# Patient Record
Sex: MALE | Race: WHITE | NOT HISPANIC OR LATINO | Employment: OTHER | URBAN - METROPOLITAN AREA
[De-identification: names, ages, dates, MRNs, and addresses within clinical notes are randomized per-mention and may not be internally consistent; named-entity substitution may affect disease eponyms.]

---

## 2017-01-06 ENCOUNTER — OFFICE VISIT (OUTPATIENT)
Dept: MEDICAL GROUP | Facility: PHYSICIAN GROUP | Age: 67
End: 2017-01-06
Payer: OTHER GOVERNMENT

## 2017-01-06 VITALS
HEART RATE: 104 BPM | TEMPERATURE: 97.9 F | SYSTOLIC BLOOD PRESSURE: 134 MMHG | DIASTOLIC BLOOD PRESSURE: 68 MMHG | HEIGHT: 70 IN | OXYGEN SATURATION: 92 % | BODY MASS INDEX: 28.77 KG/M2 | WEIGHT: 201 LBS | RESPIRATION RATE: 16 BRPM

## 2017-01-06 DIAGNOSIS — E87.1 HYPONATREMIA: ICD-10-CM

## 2017-01-06 DIAGNOSIS — B18.2 CHRONIC HEPATITIS C WITHOUT HEPATIC COMA (HCC): ICD-10-CM

## 2017-01-06 PROCEDURE — 99213 OFFICE O/P EST LOW 20 MIN: CPT | Performed by: FAMILY MEDICINE

## 2017-01-06 NOTE — Clinical Note
[facility]  [address]  [facility] [address]     January 6, 2017    Patient: Elvin Malone   YOB: 1950   Date of Visit: 1/6/2017       To Whom It May Concern:    Elvin Malone was seen and treated in our department on 1/6/2017  for medical reasons.  Pt is excused from work on 1/6/2017.  Pt may return to   Work on 1/9/2017.      Sincerely,         Yudi Cobb D.O.

## 2017-01-06 NOTE — MR AVS SNAPSHOT
"Elvin Malone   2017 3:20 PM   Office Visit   MRN: [id number]    Department:  [facility]   Dept Phone:  [phone]    Description:  Male : 1950   Provider:  Yudi Cobb D.O.           Reason for Visit     Follow-Up           Allergies as of 2017     No Known Allergies      You were diagnosed with     Chronic hepatitis C without hepatic coma (HCC)   [965957]       Hyponatremia   [886222]         Vital Signs     Blood Pressure Pulse Temperature Respirations Height Weight    134/68 mmHg 104 36.6 °C (97.9 °F) 16 1.778 m (5' 10\") 91.173 kg (201 lb)    Body Mass Index Oxygen Saturation Smoking Status             28.84 kg/m2 92% Never Smoker          Basic Information     Date Of Birth Sex Race Ethnicity Preferred Language    1950 Male White Non- English      Your appointments     2017  2:00 PM   FOLLOW UP with Adelina Sequeira M.D.   [facility]    [address]  [facility] [id number] [phone]            Feb 10, 2017  8:00 AM   Follow Up Med Management with Dennis Powers M.D.   [facility]IOR[facility] [address] [place] [address])    [address] [place] [address]  [address]  [facility] [address]   [phone]            2017  4:20 PM   Established Patient with Yudi Cobb D.O.   [facility])    [address]  [facility] [address] [phone]           You will be receiving a confirmation call a few days before your appointment from our automated call confirmation system.              Problem List              ICD-10-CM Priority Class Noted - Resolved    Anxiety F41.9   2014 - Present    Hepatitis C B19.20   2014 - Present    Osteoarthrosis involving lower leg M17.9   3/2/2015 - Present    Sleep apnea G47.30   3/2/2016 - Present      Health Maintenance        Date Due Completion Dates    IMM DTaP/Tdap/Td Vaccine (1 - Tdap) 1971 ---    IMM " ZOSTER VACCINE 7/20/2012 ---    COLONOSCOPY 5/5/2023 5/5/2013 (Done)    Override on 5/5/2013: Done ([facility] )            Current Immunizations     Influenza TIV (IM) 12/8/2016    Pneumococcal Vaccine (PCV7) Historical Data 12/5/2015    Pneumococcal polysaccharide vaccine (PPSV-23) 11/11/2011      Below and/or attached are the medications your provider expects you to take. Review all of your home medications and newly ordered medications with your provider and/or pharmacist. Follow medication instructions as directed by your provider and/or pharmacist. Please keep your medication list with you and share with your provider. Update the information when medications are discontinued, doses are changed, or new medications (including over-the-counter products) are added; and carry medication information at all times in the event of emergency situations     Allergies:  No Known Allergies          Medications  Valid as of: January 06, 2017 -  3:59 PM    Generic Name Brand Name Tablet Size Instructions for use    Aspirin (Chew Tab) ASA 81 MG Take 1 Tab by mouth every day.        Fluticasone Propionate (Suspension) FLONASE 50 MCG/ACT Spray 1 Spray in nose 2 times a day.        Sertraline HCl (Tab) ZOLOFT 100 MG Take 1 Tab by mouth every day.        .                 Medicines prescribed today were sent to:     [id number] - [place], [place] - [address]    [address] [place] [address]    Phone: [phone] Fax: [phone]    Open 24 Hours?: No      Medication refill instructions:       If your prescription bottle indicates you have medication refills left, it is not necessary to call your provider’s office. Please contact your pharmacy and they will refill your medication.    If your prescription bottle indicates you do not have any refills left, you may request refills at any time through one of the following ways: The online [employer] system (except Urgent Care), by calling your  provider’s office, or by asking your pharmacy to contact your provider’s office with a refill request. Medication refills are processed only during regular business hours and may not be available until the next business day. Your provider may request additional information or to have a follow-up visit with you prior to refilling your medication.   *Please Note: Medication refills are assigned a new Rx number when refilled electronically. Your pharmacy may indicate that no refills were authorized even though a new prescription for the same medication is available at the pharmacy. Please request the medicine by name with the pharmacy before contacting your provider for a refill.        Your To Do List     Future Labs/Procedures Complete By Expires    BASIC METABOLIC PANEL  As directed 1/6/2018    OSMOLALITY SERUM  As directed 1/6/2018    OSMOLALITY URINE  As directed 1/6/2018    URINE SODIUM RANDOM  As directed 1/6/2018         MyChart Access Code: Activation code not generated  Current [employer] Status: Active

## 2017-01-06 NOTE — PROGRESS NOTES
Subjective:     Chief Complaint   Patient presents with   • Follow-Up       Elvin Malone is a 66 y.o. male here today for one-month follow-up.    Chronic hep C  He was referred to GI, who will see pt on . Liver enzymes were elevated on recent labs of AST ALT and alkaline phosphatase significantly elevated. Patient has no signs of ascites, jaundice, shortness of breath.    Anxiety  Pt reports resolved since last visit. Patient is no longer taking propanolol. He states that taking Zoloft daily has controlled his mood. He denies any depressed mood, anxiety, thoughts of self-harm, homicidal ideations, or unstable mood.    Lab results showed hyponatremia with sodium of 130. Patient denies any changes in urine output, dehydration, or excessive water intake.          No Known Allergies  Current medicines (including changes today)  Current Outpatient Prescriptions   Medication Sig Dispense Refill   • aspirin (ASA) 81 MG Chew Tab chewable tablet Take 1 Tab by mouth every day. 100 Tab 0   • sertraline (ZOLOFT) 100 MG Tab Take 1 Tab by mouth every day. 30 Tab 2   • fluticasone (FLONASE) 50 MCG/ACT nasal spray Spray 1 Spray in nose 2 times a day. 16 g 3     No current facility-administered medications for this visit.     Social History   Substance Use Topics   • Smoking status: Never Smoker    • Smokeless tobacco: Never Used      Comment: continue to avoid   • Alcohol Use: No     Family Status   Relation Status Death Age   • Mother     • Brother Alive    • Brother Alive    • Brother     • Father Other    • Maternal Grandmother     • Maternal Grandfather       Family History   Problem Relation Age of Onset   • Lung Disease Mother    • Cancer Mother      Lung   • Other Mother      Kidney problem   • Other Brother    • Lung Disease Brother      smoker   • Heart Disease Brother    • Hypertension Brother    • Hyperlipidemia Brother    • Arthritis Brother      RA     He    has a past  "medical history of Sleep apnea; Snoring; Allergy, unspecified not elsewhere classified; Anxiety; Arthritis; Pain (2/16/15 ); Pneumonia; Depression; Type II or unspecified type diabetes mellitus without mention of complication, not stated as uncontrolled; and Hepatitis C. He also has no past medical history of Anemia, Blood transfusion, without reported diagnosis, Clotting disorder (HCC), Chronic airway obstruction, not elsewhere classified (HCC), Diabetic neuropathy (HCC), GERD (gastroesophageal reflux disease), Headache(784.0), Hyperlipidemia, IBD (inflammatory bowel disease), Meningitis, Headache, classical migraine, Substance abuse, Ulcer (HCC), Asymptomatic human immunodeficiency virus (HIV) infection status, or Urinary tract infection, site not specified.        ROS   GEN: no weight loss, fevers, or chills  HEENT: no blurry vision or change in vision, no ear pain, no difficulty swallowing, no throat pain, no runny nose, no nasal congestion  Resp: no shortness of breath, no cough  CV: no racing heart, no irregular beats, no chest pain  Abd: no nausea, no vomiting, no diarrhea, no constipation, no blood in stool, no dark stools, no incontinence         Objective:     Blood pressure 134/68, pulse 104, temperature 36.6 °C (97.9 °F), resp. rate 16, height 1.778 m (5' 10\"), weight 91.173 kg (201 lb), SpO2 92 %. Body mass index is 28.84 kg/(m^2).  Physical Exam:  Constitutional: Alert, no distress.  Skin: Warm, dry, good turgor, no rashes in visible areas.  Eye: Equal, round and reactive, conjunctiva clear, lids normal.  Neck: Trachea midline, no masses, no thyromegaly. No cervical or supraclavicular lymphadenopathy. Full ROM  Respiratory: Unlabored respiratory effort, good air movement, lungs clear to auscultation, no wheezes, no ronchi.  Cardiovascular:RRR, +S1, S2, no murmur, no peripheral edema, pedal and radial pulses equal and intact bilat  Abdomen: Soft, non-tender, no masses, no hepatosplenomegaly.  MSK:5/5 " muscle strength in upper extremities as well as lower extremity bilaterally  Psych: Alert and oriented x3, appropriate affect and mood.  Neuro: CN2-12 intact, no gross motor or sensory deficits      Assessment and Plan:   The following treatment plan was discussed    1. Chronic hepatitis C without hepatic coma (HCC)  AST, ALT, alkaline phosphatase noted to be elevated. Patient has appointment with GI on January 10.    2. Hyponatremia  New finding: Patient has no symptoms of hyponatremia. Denies excessive water intake or low solute diet. Differential diagnosis includes liver cirrhosis, SIADH due to steroid use, hypothyroidism. We will repeat BMP to assess sodium level. I've also ordered urinalysis body, sodium is mildly, urine sodium, and TSH for evaluation of cause.  - BASIC METABOLIC PANEL; Future  - OSMOLALITY SERUM; Future  - URINE SODIUM RANDOM; Future  - OSMOLALITY URINE; Future  - TSH WITH REFLEX TO FT4; Future      Followup: Return in about 3 months (around 4/6/2017).    Please note that this dictation was created using voice recognition software. I have made every reasonable attempt to correct obvious errors, but I expect that there are errors of grammar and possibly content that I did not discover before finalizing the note.

## 2017-01-24 ENCOUNTER — HOSPITAL ENCOUNTER (OUTPATIENT)
Dept: RADIOLOGY | Facility: MEDICAL CENTER | Age: 67
End: 2017-01-24
Attending: PHYSICIAN ASSISTANT
Payer: OTHER GOVERNMENT

## 2017-01-24 DIAGNOSIS — R94.5 NONSPECIFIC ABNORMAL RESULTS OF LIVER FUNCTION STUDY: ICD-10-CM

## 2017-01-24 DIAGNOSIS — B18.2 CHRONIC HEPATITIS C WITH HEPATIC COMA (HCC): ICD-10-CM

## 2017-01-24 PROCEDURE — 76705 ECHO EXAM OF ABDOMEN: CPT

## 2017-02-10 ENCOUNTER — OFFICE VISIT (OUTPATIENT)
Dept: URGENT CARE | Facility: PHYSICIAN GROUP | Age: 67
End: 2017-02-10
Payer: OTHER GOVERNMENT

## 2017-02-10 ENCOUNTER — APPOINTMENT (OUTPATIENT)
Dept: BEHAVIORAL HEALTH | Facility: PHYSICIAN GROUP | Age: 67
End: 2017-02-10
Payer: OTHER GOVERNMENT

## 2017-02-10 VITALS
WEIGHT: 200 LBS | OXYGEN SATURATION: 95 % | HEART RATE: 89 BPM | BODY MASS INDEX: 28.63 KG/M2 | HEIGHT: 70 IN | DIASTOLIC BLOOD PRESSURE: 70 MMHG | SYSTOLIC BLOOD PRESSURE: 112 MMHG | TEMPERATURE: 98.2 F

## 2017-02-10 DIAGNOSIS — S39.012A LUMBAR STRAIN, INITIAL ENCOUNTER: ICD-10-CM

## 2017-02-10 PROCEDURE — 99214 OFFICE O/P EST MOD 30 MIN: CPT | Performed by: FAMILY MEDICINE

## 2017-02-10 RX ORDER — PREDNISONE 20 MG/1
TABLET ORAL
Qty: 15 TAB | Refills: 0 | Status: SHIPPED | OUTPATIENT
Start: 2017-02-10

## 2017-02-10 RX ORDER — OXYCODONE HYDROCHLORIDE 5 MG/1
TABLET ORAL
Qty: 15 TAB | Refills: 0 | Status: SHIPPED | OUTPATIENT
Start: 2017-02-10

## 2017-02-10 RX ORDER — CYCLOBENZAPRINE HCL 10 MG
TABLET ORAL
Qty: 30 TAB | Refills: 0 | Status: SHIPPED | OUTPATIENT
Start: 2017-02-10

## 2017-02-10 RX ORDER — KETOROLAC TROMETHAMINE 30 MG/ML
30 INJECTION, SOLUTION INTRAMUSCULAR; INTRAVENOUS ONCE
Status: COMPLETED | OUTPATIENT
Start: 2017-02-10 | End: 2017-02-10

## 2017-02-10 RX ADMIN — KETOROLAC TROMETHAMINE 30 MG: 30 INJECTION, SOLUTION INTRAMUSCULAR; INTRAVENOUS at 15:57

## 2017-02-10 ASSESSMENT — PAIN SCALES - GENERAL: PAINLEVEL: 6=MODERATE PAIN

## 2017-02-10 NOTE — PROGRESS NOTES
Chief Complaint:    Chief Complaint   Patient presents with   • Back Pain     Bent over while turning and reaching when back began to spasm.  Was not lifting anything. Occurance happend 3 hours ago.       History of Present Illness:    Wife present. This is a new problem. 3 hours ago bent over while turning and reaching to move a cord. Left lower back started spasming. Now pain is moderate-severe. No radiating pain down legs. No loss of bowel/bladder control. Took 2 Tylenol about 2 hours ago without help. Had similar problem many years ago and was treated with medication with help. Tolerated recent Prednisone 60 mg a day x 5 days rx'd for hives. Has taken muscle relaxer in past, Flexeril sounds familiar. Has tolerated Oxycodone in past.      Review of Systems:    Constitutional: Negative for fever, chills, and diaphoresis.   Eyes: Negative for change in vision, photophobia, pain, redness, and discharge.  ENT: Negative for ear pain, ear discharge, hearing loss, tinnitus, nasal congestion, nosebleeds, and sore throat.    Respiratory: Negative for cough, hemoptysis, sputum production, shortness of breath, wheezing, and stridor.    Cardiovascular: Negative for chest pain, palpitations, orthopnea, claudication, leg swelling, and PND.   Gastrointestinal: Has Hepatitis C and elevated LFTs. Negative for abdominal pain, nausea, vomiting, diarrhea, constipation, blood in stool, and melena.   Genitourinary: Negative for dysuria, urinary urgency, urinary frequency, hematuria, and flank pain.   Musculoskeletal: See HPI.   Skin: Negative for rash and itching.   Neurological: Negative for dizziness, tingling, tremors, sensory change, speech change, focal weakness, seizures, loss of consciousness, and headaches.   Endo: Negative for polydipsia.   Heme: Does not bruise/bleed easily.   Psychiatric/Behavioral: Depression/anxiety, on medication.    Past Medical History:    Past Medical History   Diagnosis Date   • Sleep apnea      uses  CPAP   • Snoring    • Allergy, unspecified not elsewhere classified    • Anxiety    • Arthritis      chronic backpain   • Pain 2/16/15      right knee and back 5/10   • Pneumonia      age 7   • Depression      anxiety   • Type II or unspecified type diabetes mellitus without mention of complication, not stated as uncontrolled      Pre diabetes- diet controlled   • Hepatitis C        Past Surgical History:    Past Surgical History   Procedure Laterality Date   • Other abdominal surgery  1980     left and right inguinal hernia hepair   • Parotidectomy superficial  12/6/2010     Performed by DARWIN JUÁREZ at SURGERY SAME DAY [facility]   • Abdominal exploration     • Hernia repair        left and right inguinal   • Circumcision child     • Other orthopedic surgery  1990     right meniscus repair   • Knee arthroscopy       Cleaned up   • Knee arthroplasty total  3/2/2015     Performed by Max Quiles M.D. at SURGERY [facility]   • Inguinal hernia laparoscopic bilateral  4/15/2016     Procedure: INGUINAL HERNIA LAPAROSCOPIC BILATERAL;  Surgeon: Hammad Bradley M.D.;  Location: SURGERY [facility];  Service:    • Knee replacement, total  2014     Right       Social History:    Social History     Social History   • Marital Status:      Spouse Name: N/A   • Number of Children: N/A   • Years of Education: N/A     Occupational History   • Not on file.     Social History Main Topics   • Smoking status: Never Smoker    • Smokeless tobacco: Never Used      Comment: continue to avoid   • Alcohol Use: No   • Drug Use: No   • Sexual Activity: No     Other Topics Concern   • Not on file     Social History Narrative       Family History:    Family History   Problem Relation Age of Onset   • Lung Disease Mother    • Cancer Mother      Lung   • Other Mother      Kidney problem   • Other Brother    • Lung Disease Brother      smoker   • Heart Disease Brother    • Hypertension Brother    • Hyperlipidemia  "Brother    • Arthritis Brother      RA       Medications:    Current Outpatient Prescriptions on File Prior to Visit   Medication Sig Dispense Refill   • aspirin (ASA) 81 MG Chew Tab chewable tablet Take 1 Tab by mouth every day. 100 Tab 0   • sertraline (ZOLOFT) 100 MG Tab Take 1 Tab by mouth every day. 30 Tab 2   • fluticasone (FLONASE) 50 MCG/ACT nasal spray Spray 1 Spray in nose 2 times a day. 16 g 3     No current facility-administered medications on file prior to visit.       Allergies:    No Known Allergies      Vitals:    Filed Vitals:    02/10/17 1527   BP: 112/70   Pulse: 89   Temp: 36.8 °C (98.2 °F)   Height: 1.778 m (5' 10\")   Weight: 90.719 kg (200 lb)   SpO2: 95%       Physical Exam:    Constitutional: Vital signs reviewed. Appears well-developed and well-nourished. No acute distress.   Eyes: Sclera white, conjunctivae clear.  ENT: External ears normal. Hearing normal.  Pulmonary/Chest: Respirations non-labored.  Musculoskeletal: Walks mildly cautious. Decreased lumbar ROM - flexion (severe), extension (moderate), left rotation (moderate), right lateral bending (moderate), otherwise mild decrease. No tenderness to palpation. No muscular atrophy or weakness.  Neurological: Alert and oriented to person, place, and time. Muscle tone normal. Coordination normal. Light touch and sensation normal.   Skin: No rashes or lesions. Warm, dry, normal turgor.  Psychiatric: Normal mood and affect. Behavior is normal. Judgment and thought content normal.       Assessment / Plan:    1. Lumbar strain, initial encounter  - ketorolac (TORADOL) injection 30 mg; 1 mL by Intramuscular route Once.  - predniSONE (DELTASONE) 20 MG Tab; 3 TABS ONCE A DAY X 5 DAYS. TAKE WITH FOOD.  Dispense: 15 Tab; Refill: 0  - oxycodone immediate-release (ROXICODONE) 5 MG Tab; 1 TAB EVERY 6 HOURS ONLY IF NEEDED FOR PAIN. MAY CAUSE DROWSINESS.  Dispense: 15 Tab; Refill: 0  - cyclobenzaprine (FLEXERIL) 10 MG Tab; 1 TAB EVERY 8 HOURS ONLY IF " NEEDED FOR PAIN, MUSCLE SPASM, AND/OR MUSCLE TIGHTNESS. MAY CAUSE DROWSINESS.  Dispense: 30 Tab; Refill: 0      Discussed with them DDX and management options.    Rec'd relative rest.    Agreeable to potent anti-inflammatory medication, prn muscle relaxer, and prn pain killer with medications given and prescribed.  report checked - last narcotic Rx was Oxycodone-APAP 5-325 mg #60 on 4/15/16.    Will avoid APAP due to elevated LFTs (Hep C).    Follow-up with PCP or urgent care if getting worse or not gradually better with time and above.

## 2017-02-10 NOTE — MR AVS SNAPSHOT
"        Elvin Malone   2/10/2017 3:20 PM   Office Visit   MRN: [id number]    Department:  [facility]   Dept Phone:  [phone]    Description:  Male : 1950   Provider:  Garret Eaton M.D.           Reason for Visit     Back Pain Bent over while turning and reaching when back began to spasm.  Was not lifting anything. Occurance happend 3 hours ago.      Allergies as of 2/10/2017     No Known Allergies      You were diagnosed with     Lumbar strain, initial encounter   [020367]         Vital Signs     Blood Pressure Pulse Temperature Height Weight Body Mass Index    112/70 mmHg 89 36.8 °C (98.2 °F) 1.778 m (5' 10\") 90.719 kg (200 lb) 28.70 kg/m2    Oxygen Saturation Smoking Status                95% Never Smoker           Basic Information     Date Of Birth Sex Race Ethnicity Preferred Language    1950 Male White Non- English      Your appointments     2017  1:30 PM   Follow Up Med Management with Dennis Powers M.D.   BEHAVIORAL HEALTH [address] [place] [address])    [address] [place] [address]  [address]  [facility] [address]   [phone]            2017  4:20 PM   Established Patient with Yudi Cobb D.O.   [facility])    [address] [place] [address], [address]  [facility] [address] [phone]           You will be receiving a confirmation call a few days before your appointment from our automated call confirmation system.              Problem List              ICD-10-CM Priority Class Noted - Resolved    Anxiety F41.9   2014 - Present    Hepatitis C B19.20   2014 - Present    Osteoarthrosis involving lower leg M17.9   3/2/2015 - Present    Sleep apnea G47.30   3/2/2016 - Present      Health Maintenance        Date Due Completion Dates    IMM DTaP/Tdap/Td Vaccine (1 - Tdap) 1971 ---    IMM ZOSTER VACCINE 2012 ---    COLONOSCOPY 2023 (Done)    Override on 2013: Done ([facility] )            "   Current Immunizations     Influenza TIV (IM) 12/8/2016    Pneumococcal Vaccine (PCV7) Historical Data 12/5/2015    Pneumococcal polysaccharide vaccine (PPSV-23) 11/11/2011      Below and/or attached are the medications your provider expects you to take. Review all of your home medications and newly ordered medications with your provider and/or pharmacist. Follow medication instructions as directed by your provider and/or pharmacist. Please keep your medication list with you and share with your provider. Update the information when medications are discontinued, doses are changed, or new medications (including over-the-counter products) are added; and carry medication information at all times in the event of emergency situations     Allergies:  No Known Allergies          Medications  Valid as of: February 10, 2017 -  4:16 PM    Generic Name Brand Name Tablet Size Instructions for use    Acetaminophen   Take  by mouth.        Aspirin (Chew Tab) ASA 81 MG Take 1 Tab by mouth every day.        Cyclobenzaprine HCl (Tab) FLEXERIL 10 MG 1 TAB EVERY 8 HOURS ONLY IF NEEDED FOR PAIN, MUSCLE SPASM, AND/OR MUSCLE TIGHTNESS. MAY CAUSE DROWSINESS.        Fluticasone Propionate (Suspension) FLONASE 50 MCG/ACT Spray 1 Spray in nose 2 times a day.        OxyCODONE HCl (Tab) ROXICODONE 5 MG 1 TAB EVERY 6 HOURS ONLY IF NEEDED FOR PAIN. MAY CAUSE DROWSINESS.        PredniSONE (Tab) DELTASONE 20 MG 3 TABS ONCE A DAY X 5 DAYS. TAKE WITH FOOD.        Sertraline HCl (Tab) ZOLOFT 100 MG Take 1 Tab by mouth every day.        .                 Medicines prescribed today were sent to:     [id number] - [place] - [address] [facility]    [address] [facility] [place] [address]    Phone: [phone] Fax: [phone]    Open 24 Hours?: No      Medication refill instructions:       If your prescription bottle indicates you have medication refills left, it is not necessary to call your provider’s office. Please contact your pharmacy  and they will refill your medication.    If your prescription bottle indicates you do not have any refills left, you may request refills at any time through one of the following ways: The online [employer] system (except Urgent Care), by calling your provider’s office, or by asking your pharmacy to contact your provider’s office with a refill request. Medication refills are processed only during regular business hours and may not be available until the next business day. Your provider may request additional information or to have a follow-up visit with you prior to refilling your medication.   *Please Note: Medication refills are assigned a new Rx number when refilled electronically. Your pharmacy may indicate that no refills were authorized even though a new prescription for the same medication is available at the pharmacy. Please request the medicine by name with the pharmacy before contacting your provider for a refill.           [employer] Access Code: Activation code not generated  Current [employer] Status: Active

## 2017-02-21 ENCOUNTER — OFFICE VISIT (OUTPATIENT)
Dept: BEHAVIORAL HEALTH | Facility: PHYSICIAN GROUP | Age: 67
End: 2017-02-21
Payer: OTHER GOVERNMENT

## 2017-02-21 DIAGNOSIS — F34.1 DYSTHYMIA: ICD-10-CM

## 2017-02-21 PROCEDURE — 99214 OFFICE O/P EST MOD 30 MIN: CPT | Performed by: PSYCHIATRY & NEUROLOGY

## 2017-02-21 RX ORDER — SERTRALINE HYDROCHLORIDE 100 MG/1
100 TABLET, FILM COATED ORAL DAILY
Qty: 90 TAB | Refills: 1 | Status: SHIPPED | OUTPATIENT
Start: 2017-02-21 | End: 2017-03-23 | Stop reason: SDUPTHER

## 2017-02-21 NOTE — MR AVS SNAPSHOT
Elvin Malone   2017 1:30 PM   Office Visit   MRN: [id number]    Department:  [facility]   Dept Phone:  [phone]    Description:  Male : 1950   Provider:  Dennis Powers M.D.           Reason for Visit     Follow-Up things have been better since I quit      Allergies as of 2017     No Known Allergies      You were diagnosed with     Dysthymia   [445297]         Vital Signs     Smoking Status                   Never Smoker            Basic Information     Date Of Birth Sex Race Ethnicity Preferred Language    1950 Male White Non- English      Your appointments     2017  4:20 PM   Established Patient with Yudi Cobb D.O.   [facility])    [address], [address]  [place] [address] [phone]           You will be receiving a confirmation call a few days before your appointment from our automated call confirmation system.              Problem List              ICD-10-CM Priority Class Noted - Resolved    Anxiety F41.9   2014 - Present    Hepatitis C B19.20   2014 - Present    Osteoarthrosis involving lower leg M17.9   3/2/2015 - Present    Sleep apnea G47.30   3/2/2016 - Present      Health Maintenance        Date Due Completion Dates    IMM DTaP/Tdap/Td Vaccine (1 - Tdap) 1971 ---    IMM ZOSTER VACCINE 2012 ---    COLONOSCOPY 2023 (Done)    Override on 2013: Done ([facility] )            Current Immunizations     Influenza TIV (IM) 2016    Pneumococcal Vaccine (PCV7) Historical Data 2015    Pneumococcal polysaccharide vaccine (PPSV-23) 2011      Below and/or attached are the medications your provider expects you to take. Review all of your home medications and newly ordered medications with your provider and/or pharmacist. Follow medication instructions as directed by your provider and/or pharmacist. Please keep your medication  list with you and share with your provider. Update the information when medications are discontinued, doses are changed, or new medications (including over-the-counter products) are added; and carry medication information at all times in the event of emergency situations     Allergies:  No Known Allergies          Medications  Valid as of: February 21, 2017 -  2:14 PM    Generic Name Brand Name Tablet Size Instructions for use    Acetaminophen   Take  by mouth.        Aspirin (Chew Tab) ASA 81 MG Take 1 Tab by mouth every day.        Cyclobenzaprine HCl (Tab) FLEXERIL 10 MG 1 TAB EVERY 8 HOURS ONLY IF NEEDED FOR PAIN, MUSCLE SPASM, AND/OR MUSCLE TIGHTNESS. MAY CAUSE DROWSINESS.        Fluticasone Propionate (Suspension) FLONASE 50 MCG/ACT Spray 1 Spray in nose 2 times a day.        OxyCODONE HCl (Tab) ROXICODONE 5 MG 1 TAB EVERY 6 HOURS ONLY IF NEEDED FOR PAIN. MAY CAUSE DROWSINESS.        PredniSONE (Tab) DELTASONE 20 MG 3 TABS ONCE A DAY X 5 DAYS. TAKE WITH FOOD.        Sertraline HCl (Tab) ZOLOFT 100 MG Take 1 Tab by mouth every day.        .                 Medicines prescribed today were sent to:     [id number] - [place], [place] - [address]    [address] [place] [address]    Phone: [phone] Fax: [phone]    Open 24 Hours?: No      Medication refill instructions:       If your prescription bottle indicates you have medication refills left, it is not necessary to call your provider’s office. Please contact your pharmacy and they will refill your medication.    If your prescription bottle indicates you do not have any refills left, you may request refills at any time through one of the following ways: The online [employer] system (except Urgent Care), by calling your provider’s office, or by asking your pharmacy to contact your provider’s office with a refill request. Medication refills are processed only during regular business hours and may not be available until the next business  day. Your provider may request additional information or to have a follow-up visit with you prior to refilling your medication.   *Please Note: Medication refills are assigned a new Rx number when refilled electronically. Your pharmacy may indicate that no refills were authorized even though a new prescription for the same medication is available at the pharmacy. Please request the medicine by name with the pharmacy before contacting your provider for a refill.           [employer] Access Code: Activation code not generated  Current [employer] Status: Active

## 2017-02-21 NOTE — PROGRESS NOTES
[facility]  PSYCHIATRIC FOLLOW-UP NOTE    Name: Elvin Malone  MRN: [id number]  : 1950  Age: 66 y.o.  Date of assessment: 2017  PCP: Yudi Cobb D.O.  Persons in attendance: Patient    REASON FOR VISIT/CHIEF COMPLAINT (as stated by Patient):  Elvin Malone is a 66 y.o., White male, attending follow-up appointment for   Chief Complaint   Patient presents with   • Follow-Up     things have been better since I quit   .      HISTORY OF PRESENT ILLNESS:    Pt being tx for MDD, anxiety.  Pt has been complaint with meds, and denies SE.   Pt reports that he and his wife put the house on the market, and it has sold.  He and the wife are going to be moving.  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  Pt explains that he and the wife had been discussing a change for a while, but the wife accelerated the house sale.  Pt reports he has been doing well with just the Zoloft 100mg PO Daily.  He stopped taking the Klonopin after the last visit, used the propranolol instead, but has not even taken that since quiting the job.  Pt reports that he actually feels better since stopping the Propranolol as well.  Pt notes he has been sleeping well.  Pt reports putting on weight, but he believes this is due to stress eating.  Discussed therapy types and self learning.  Denies any alcohol use or smoking.     PSYCHOSOCIAL CHANGES SINCE PREVIOUS CONTACT:  Pt quit his job with welfare office  Pt and wife selling house  Will be moving to [place]    RESPONSE TO TREATMENT:  Feels improved since stopping Klonopin and Propranolol    MEDICATION SIDE EFFECTS:  denies    REVIEW OF SYSTEMS:        Constitutional negative   Eyes negative   Ears/Nose/Mouth/Throat negative   Cardiovascular negative   Respiratory negative   Gastrointestinal negative   Genitourinary negative   Muscular negative   Integumentary negative   Neurological negative   Endocrine negative   Hematologic/Lymphatic negative       PSYCHIATRIC  EXAMINATION/MENTAL STATUS  There were no vitals taken for this visit.  Participation: Active verbal participation  Grooming:Neat  Orientation: Alert and Fully Oriented  Eye contact: Good  Behavior:Calm   Mood: Euthymic  Affect: Flexible and Congruent with content  Thought process: Logical and Goal-directed  Thought content:  Within normal limits  Speech: Rate within normal limits and Volume within normal limits  Perception:  Within normal limits  Memory:  No gross evidence of memory deficits  Insight: Good  Judgment: Good  Family/couple interaction observations:   Other:    Current risk:    Suicide: Low   Homicide: Low   Self-harm: Low  Relapse: Low  Other:   Crisis Safety Plan reviewed?Yes  If evidence of imminent risk is present, intervention/plan:    Medical Records/Labs/Diagnostic Tests Reviewed: Reviewed, mild hyponatremia, low blood osmolarity     Medical Records/Labs/Diagnostic Tests Ordered: pt likely moving    DIAGNOSTIC IMPRESSION(S):  1. Dysthymia           ASSESSMENT AND PLAN:  #Dysthymia  Pt reports he has been doing well on the current dose of Zoloft 100mg.  Pt notes that his mood and anxiety especially improved when he quit his stressful job.  Pt explains that he and the wife have sold the house and will be moving to [place].,  Pt appears excited for the change.  Pt has been able to stop both the Klonopin and the Propranolol, and notes that he feels better since stopping the two.  Pt asked about attempts to taper off the Zoloft as well.  Discussed the pros/cons of doing so.  Encouraged pt to stay on a maintenance dose for a while unitl things settle down, and also provided information for self-study therapy materials.  Encouraged pt to learn some coping skills, which could possibly help him taper off the Zoloft one day.      Continue Zoloft 100mg PO Daily, will give 3 month supply  Encouraged pt to get established when they move.     RTC PRN, as pt may move next week    More than 50% of face-to-face time  in this 30 minute visit was not spent in psychotherapy/counseling.    Topics addressed include:    Dennis Powers M.D.

## 2017-03-20 ENCOUNTER — TELEPHONE (OUTPATIENT)
Dept: BEHAVIORAL HEALTH | Facility: PHYSICIAN GROUP | Age: 67
End: 2017-03-20

## 2017-03-20 NOTE — TELEPHONE ENCOUNTER
----- Message from Diana Mendez sent at 3/20/2017  1:57 PM PDT -----  [phone] please call pt re: he will be traveling and wants to discuss precriptions with you

## 2017-03-20 NOTE — TELEPHONE ENCOUNTER
Spoke to pt.  He would like to use [place] as a home [place] for travel.  He would like to get a mail-order script service going  Encourage pt to speak to insurance/pharmacy plan and get the details.    Advised him I would sign the conversion paperwork if need, so fax it to office.

## 2017-03-23 DIAGNOSIS — F34.1 DYSTHYMIA: ICD-10-CM

## 2017-03-24 RX ORDER — SERTRALINE HYDROCHLORIDE 100 MG/1
100 TABLET, FILM COATED ORAL DAILY
Qty: 90 TAB | Refills: 0 | Status: SHIPPED | OUTPATIENT
Start: 2017-03-24

## 2017-08-24 ENCOUNTER — HOSPITAL ENCOUNTER (OUTPATIENT)
Dept: RADIOLOGY | Facility: MEDICAL CENTER | Age: 67
End: 2017-08-24
Attending: PHYSICIAN ASSISTANT
Payer: MEDICARE

## 2017-08-24 DIAGNOSIS — B18.2 CHRONIC HEPATITIS C WITH HEPATIC COMA (HCC): ICD-10-CM

## 2017-08-24 PROCEDURE — 76700 US EXAM ABDOM COMPLETE: CPT
